# Patient Record
Sex: MALE | Race: WHITE | HISPANIC OR LATINO
[De-identification: names, ages, dates, MRNs, and addresses within clinical notes are randomized per-mention and may not be internally consistent; named-entity substitution may affect disease eponyms.]

---

## 2019-10-18 ENCOUNTER — APPOINTMENT (OUTPATIENT)
Dept: VASCULAR SURGERY | Facility: CLINIC | Age: 64
End: 2019-10-18
Payer: MEDICARE

## 2019-10-18 VITALS
DIASTOLIC BLOOD PRESSURE: 74 MMHG | BODY MASS INDEX: 25.76 KG/M2 | SYSTOLIC BLOOD PRESSURE: 126 MMHG | HEART RATE: 90 BPM | WEIGHT: 170 LBS | TEMPERATURE: 98.3 F | HEIGHT: 68 IN

## 2019-10-18 DIAGNOSIS — Z78.9 OTHER SPECIFIED HEALTH STATUS: ICD-10-CM

## 2019-10-18 DIAGNOSIS — I10 ESSENTIAL (PRIMARY) HYPERTENSION: ICD-10-CM

## 2019-10-18 DIAGNOSIS — Z86.39 PERSONAL HISTORY OF OTHER ENDOCRINE, NUTRITIONAL AND METABOLIC DISEASE: ICD-10-CM

## 2019-10-18 PROCEDURE — 93990 DOPPLER FLOW TESTING: CPT

## 2019-10-18 PROCEDURE — 99203 OFFICE O/P NEW LOW 30 MIN: CPT

## 2019-10-18 RX ORDER — HYDROXYZINE HYDROCHLORIDE 10 MG/1
10 TABLET ORAL
Qty: 90 | Refills: 0 | Status: ACTIVE | COMMUNITY
Start: 2019-10-04

## 2019-10-18 RX ORDER — ETHYL ALCOHOL 62 %
8.6-5 GEL (ML) TOPICAL
Qty: 60 | Refills: 0 | Status: ACTIVE | COMMUNITY
Start: 2019-04-22

## 2019-10-18 RX ORDER — CIPROFLOXACIN HYDROCHLORIDE 500 MG/1
500 TABLET, FILM COATED ORAL DAILY
Qty: 10 | Refills: 0 | Status: ACTIVE | COMMUNITY
Start: 2019-10-18 | End: 1900-01-01

## 2019-10-18 RX ORDER — HYDROXYZINE HYDROCHLORIDE 50 MG/1
50 TABLET ORAL
Refills: 0 | Status: ACTIVE | COMMUNITY

## 2019-10-18 RX ORDER — OMEPRAZOLE 20 MG/1
20 CAPSULE, DELAYED RELEASE ORAL
Qty: 90 | Refills: 0 | Status: ACTIVE | COMMUNITY
Start: 2019-06-19

## 2019-10-18 RX ORDER — FLUTICASONE PROPIONATE 50 UG/1
50 SPRAY, METERED NASAL
Qty: 16 | Refills: 0 | Status: ACTIVE | COMMUNITY
Start: 2019-10-04

## 2019-10-18 RX ORDER — SILDENAFIL CITRATE 100 MG/1
100 TABLET, FILM COATED ORAL
Refills: 0 | Status: ACTIVE | COMMUNITY

## 2019-10-18 RX ORDER — CALCIUM ACETATE 667 MG/1
667 CAPSULE ORAL
Qty: 270 | Refills: 0 | Status: ACTIVE | COMMUNITY
Start: 2019-02-15

## 2019-10-18 RX ORDER — LABETALOL HYDROCHLORIDE 300 MG/1
TABLET, FILM COATED ORAL
Refills: 0 | Status: ACTIVE | COMMUNITY

## 2019-10-18 RX ORDER — LABETALOL HYDROCHLORIDE 100 MG/1
100 TABLET, FILM COATED ORAL
Qty: 60 | Refills: 0 | Status: ACTIVE | COMMUNITY
Start: 2018-10-22

## 2019-11-11 PROBLEM — T82.898A INADEQUATE FLOW OF DIALYSIS ARTERIOVENOUS FISTULA: Status: ACTIVE | Noted: 2019-11-11

## 2019-11-12 ENCOUNTER — FORM ENCOUNTER (OUTPATIENT)
Age: 64
End: 2019-11-12

## 2019-11-12 ENCOUNTER — APPOINTMENT (OUTPATIENT)
Dept: ENDOVASCULAR SURGERY | Facility: CLINIC | Age: 64
End: 2019-11-12
Payer: MEDICARE

## 2019-11-12 VITALS
HEART RATE: 103 BPM | TEMPERATURE: 98.6 F | DIASTOLIC BLOOD PRESSURE: 74 MMHG | WEIGHT: 170 LBS | RESPIRATION RATE: 16 BRPM | OXYGEN SATURATION: 94 % | BODY MASS INDEX: 25.76 KG/M2 | SYSTOLIC BLOOD PRESSURE: 114 MMHG | HEIGHT: 68 IN

## 2019-11-12 DIAGNOSIS — N18.6 END STAGE RENAL DISEASE: ICD-10-CM

## 2019-11-12 DIAGNOSIS — T82.898A OTHER SPECIFIED COMPLICATION OF VASCULAR PROSTHETIC DEVICES, IMPLANTS AND GRAFTS, INITIAL ENCOUNTER: ICD-10-CM

## 2019-11-12 DIAGNOSIS — Z99.2 END STAGE RENAL DISEASE: ICD-10-CM

## 2019-11-12 PROCEDURE — 36907Z: CUSTOM

## 2019-11-12 PROCEDURE — 36901 INTRO CATH DIALYSIS CIRCUIT: CPT | Mod: 59

## 2019-11-12 RX ORDER — SILDENAFIL CITRATE 100 MG/1
100 TABLET, FILM COATED ORAL
Refills: 0 | Status: ACTIVE | COMMUNITY

## 2019-11-12 RX ORDER — AMBRISENTAN 10 MG/1
10 TABLET, FILM COATED ORAL
Refills: 0 | Status: ACTIVE | COMMUNITY

## 2019-11-12 NOTE — PHYSICAL EXAM
[Thrill] : thrill [Aneurysm] : aneurysm [2+] : left 2+ [Normal] : normoactive bowel sounds, soft and nontender, no hepatosplenomegaly or masses appreciated [Bleeding] : no bleeding

## 2019-11-12 NOTE — ASSESSMENT
[FreeTextEntry1] : Patient with renal failure on hemodialysis with left basilic fistula with aneurysmal degeneration and area of redness over one of the reasons around the needle site. No drainage. No bleeding. The redness has improved for the past 5 days since the patient was given one dose of IV antibiotics.Recommend Cipro 500 mg once a day, and fistulogram to rule out central venous stenosis.

## 2019-11-13 NOTE — HISTORY OF PRESENT ILLNESS
[] : left basilic fistula [FreeTextEntry4] : 11/11/2019 [FreeTextEntry5] : 9pm 11/11/2019 [FreeTextEntry1] : [place]  # [id number]\par alert and oriented x 3 \par accompanied by wife\par feels ok\par states fostula looks better. less reddened. small scab noted over arterial aneursym\par no meds today\par no reported falls  [FreeTextEntry6] : Dr. Mo

## 2019-11-13 NOTE — PAST MEDICAL HISTORY
[Increasing age ( >40 years old)] : Increasing age ( >40 years old) [No therapy indicated for cases scheduled for less than one hour] : No therapy indicated for cases scheduled for less than one hour. [FreeTextEntry1] : Malignant Hyperthermia Screening Tool and Risk of Bleeding Assessment \par \par Mr. KATI RAMOS denies family of unexpected death following Anesthesia or Exercise.\par Denies Family history of Malignant Hyperthermia, Muscle or Neuromuscular disorder and High Temperature  following exercise.\par \par Mr. KATI RAMOS denies history of Muscle Spasm, Dark or Chocolate- Colored and Unanticipated fever immediately following anaesthesia or serious exercise.\par Mr. KATI RAMOS also denies bleeding tendencies/Risks of Bleeding.\par

## 2019-11-13 NOTE — PROCEDURE
[Resume diet] : resume diet [Vital signs on admission the q 15 mins x2] : Vital signs on admission the q 15 mins x2 [Site check for bleeding/hematoma/thrill/bruit] : Site check for bleeding/hematoma/thrill/bruit [FreeTextEntry1] : left arm fistula/fistulogram/angioplasty

## 2019-12-20 ENCOUNTER — CHART COPY (OUTPATIENT)
Age: 64
End: 2019-12-20

## 2019-12-20 ENCOUNTER — APPOINTMENT (OUTPATIENT)
Dept: VASCULAR SURGERY | Facility: CLINIC | Age: 64
End: 2019-12-20
Payer: MEDICARE

## 2019-12-20 PROCEDURE — 99213 OFFICE O/P EST LOW 20 MIN: CPT

## 2019-12-20 RX ORDER — TRIAMCINOLONE ACETONIDE 1 MG/G
0.1 OINTMENT TOPICAL
Qty: 454 | Refills: 0 | Status: ACTIVE | COMMUNITY
Start: 2019-11-07

## 2019-12-20 RX ORDER — TRIAMCINOLONE ACETONIDE 1 MG/G
0.1 CREAM TOPICAL
Qty: 15 | Refills: 0 | Status: ACTIVE | COMMUNITY
Start: 2019-10-23

## 2019-12-20 NOTE — ASSESSMENT
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 Aneurysms remained stable. No active bleeding. Followup in 3 months and repeat duplex.

## 2019-12-20 NOTE — PHYSICAL EXAM
[Thrill] : thrill [Bleeding] : no bleeding [Aneurysm] : aneurysm [Normal] : normoactive bowel sounds, soft and nontender, no hepatosplenomegaly or masses appreciated

## 2020-03-27 ENCOUNTER — APPOINTMENT (OUTPATIENT)
Dept: VASCULAR SURGERY | Facility: CLINIC | Age: 65
End: 2020-03-27